# Patient Record
Sex: FEMALE | Employment: STUDENT | URBAN - METROPOLITAN AREA
[De-identification: names, ages, dates, MRNs, and addresses within clinical notes are randomized per-mention and may not be internally consistent; named-entity substitution may affect disease eponyms.]

---

## 2024-09-12 ENCOUNTER — OFFICE VISIT (OUTPATIENT)
Dept: SLEEP MEDICINE | Facility: CLINIC | Age: 19
End: 2024-09-12
Payer: COMMERCIAL

## 2024-09-12 VITALS
DIASTOLIC BLOOD PRESSURE: 71 MMHG | WEIGHT: 167.56 LBS | HEART RATE: 73 BPM | HEIGHT: 66 IN | BODY MASS INDEX: 26.93 KG/M2 | SYSTOLIC BLOOD PRESSURE: 117 MMHG

## 2024-09-12 DIAGNOSIS — G47.09 OTHER INSOMNIA: ICD-10-CM

## 2024-09-12 DIAGNOSIS — J35.1 TONSILLAR HYPERTROPHY: ICD-10-CM

## 2024-09-12 DIAGNOSIS — R06.83 SNORING: Primary | ICD-10-CM

## 2024-09-12 DIAGNOSIS — R51.9 CHRONIC NONINTRACTABLE HEADACHE, UNSPECIFIED HEADACHE TYPE: ICD-10-CM

## 2024-09-12 DIAGNOSIS — G89.29 CHRONIC NONINTRACTABLE HEADACHE, UNSPECIFIED HEADACHE TYPE: ICD-10-CM

## 2024-09-12 DIAGNOSIS — R53.83 FATIGUE, UNSPECIFIED TYPE: ICD-10-CM

## 2024-09-12 PROCEDURE — 1159F MED LIST DOCD IN RCRD: CPT | Mod: CPTII,S$GLB,, | Performed by: INTERNAL MEDICINE

## 2024-09-12 PROCEDURE — 3074F SYST BP LT 130 MM HG: CPT | Mod: CPTII,S$GLB,, | Performed by: INTERNAL MEDICINE

## 2024-09-12 PROCEDURE — 99999 PR PBB SHADOW E&M-NEW PATIENT-LVL II: CPT | Mod: PBBFAC,,, | Performed by: INTERNAL MEDICINE

## 2024-09-12 PROCEDURE — 99204 OFFICE O/P NEW MOD 45 MIN: CPT | Mod: S$GLB,,, | Performed by: INTERNAL MEDICINE

## 2024-09-12 PROCEDURE — 3078F DIAST BP <80 MM HG: CPT | Mod: CPTII,S$GLB,, | Performed by: INTERNAL MEDICINE

## 2024-09-12 PROCEDURE — 3008F BODY MASS INDEX DOCD: CPT | Mod: CPTII,S$GLB,, | Performed by: INTERNAL MEDICINE

## 2024-09-12 RX ORDER — TRAZODONE HYDROCHLORIDE 150 MG/1
150 TABLET ORAL NIGHTLY
COMMUNITY

## 2024-09-12 RX ORDER — SERTRALINE HYDROCHLORIDE 50 MG/1
50 TABLET, FILM COATED ORAL DAILY
COMMUNITY

## 2024-09-12 NOTE — PROGRESS NOTES
"Referred by Pavithra Tenorio NP     NEW PATIENT VISIT    Minerva Benton  is a pleasant 18 y.o. female who presents in the summer of 2024 for sleep evaluation    See assessment below for further history.    No past medical history on file.There is no problem list on file for this patient.  No current outpatient medications on file.    Vitals:    09/12/24 1645   BP: 117/71   BP Location: Left arm   Patient Position: Sitting   BP Method: Small (Automatic)   Pulse: 73   Weight: 76 kg (167 lb 8.8 oz)   Height: 5' 6" (1.676 m)     Physical Exam:    GEN:   Well-appearing  Psych:  Appropriate affect, demonstrates insight  SKIN:  No rash on the face or bridge of the nose      LABS:   No results found for: "CO2"      No echocardiogram results found for the past 12 months     No results found for: "FERRITIN"    RECORDS REVIEWED:      ASSESSMENT    Sig PMH: Migraines with aura  PROBLEM DESCRIPTION/ Sx on Presentation  STATUS PLAN     Screening for BRISA   Presentation:   snoring, fatigue, and increased frequency of headaches    + habitual snoring, no witnessed apneas      new   -we discussed sleep testing to evaluate for BRISA     -discussed possible treatments for BRISA including CPAP therapy      Would not expect tonsillectomy to be curative     Daytime Sx     denies sleepiness when inactive   Ritalin ER 20mg for attention issues  ESS 3/24 on intake    N/A        Insomnia     SLEEP SCHEDULE   Duration    Wind- down    Envmnt    CBTi    Meds prior    Meds now trazodone 100mg -150mg   Bed Time 11pm (later on weekends)   Lights out    Latency 1-1.5 medications   Arousals 2-3 times   Back to sleep Can take awhile   Stim. ctrl    Wake time 9:30AM   Caffeine    Naps none   Nocturia 1   Work         Waking frequently, hard to get back to sleep           new   -will reassess after evaluation for sleep-disordered breathing       Nocturia     x 1 per sleep period    new      Chronic headaches Frequent headaches consistent with migraines " since age 12  Increased over the past few months (from 1-2 per month to 3 in the past 12 days)  Waking with a headache      N/A      Tonsillar hypertrophy +1-2 on exam  Occasional sore throats  new    Other issues:     RTC:  will arrange RTC depending on results of sleep testing

## 2024-09-23 ENCOUNTER — TELEPHONE (OUTPATIENT)
Dept: SLEEP MEDICINE | Facility: OTHER | Age: 19
End: 2024-09-23
Payer: COMMERCIAL

## 2024-10-07 ENCOUNTER — TELEPHONE (OUTPATIENT)
Dept: SLEEP MEDICINE | Facility: OTHER | Age: 19
End: 2024-10-07
Payer: COMMERCIAL

## 2024-10-18 ENCOUNTER — TELEPHONE (OUTPATIENT)
Dept: SLEEP MEDICINE | Facility: OTHER | Age: 19
End: 2024-10-18
Payer: COMMERCIAL

## 2024-11-18 ENCOUNTER — TELEPHONE (OUTPATIENT)
Dept: SLEEP MEDICINE | Facility: OTHER | Age: 19
End: 2024-11-18
Payer: COMMERCIAL